# Patient Record
Sex: FEMALE | Race: WHITE | NOT HISPANIC OR LATINO | Employment: OTHER | URBAN - METROPOLITAN AREA
[De-identification: names, ages, dates, MRNs, and addresses within clinical notes are randomized per-mention and may not be internally consistent; named-entity substitution may affect disease eponyms.]

---

## 2017-04-19 ENCOUNTER — HOSPITAL ENCOUNTER (OUTPATIENT)
Dept: RADIOLOGY | Facility: MEDICAL CENTER | Age: 72
End: 2017-04-19

## 2017-04-20 ENCOUNTER — OFFICE VISIT (OUTPATIENT)
Dept: PULMONOLOGY | Facility: HOSPICE | Age: 72
End: 2017-04-20
Payer: MEDICARE

## 2017-04-20 ENCOUNTER — APPOINTMENT (OUTPATIENT)
Dept: RADIOLOGY | Facility: IMAGING CENTER | Age: 72
End: 2017-04-20
Attending: INTERNAL MEDICINE
Payer: MEDICARE

## 2017-04-20 ENCOUNTER — HOSPITAL ENCOUNTER (OUTPATIENT)
Dept: RADIOLOGY | Facility: MEDICAL CENTER | Age: 72
End: 2017-04-20

## 2017-04-20 VITALS
TEMPERATURE: 97.3 F | BODY MASS INDEX: 28.04 KG/M2 | DIASTOLIC BLOOD PRESSURE: 72 MMHG | RESPIRATION RATE: 16 BRPM | HEART RATE: 91 BPM | WEIGHT: 185 LBS | OXYGEN SATURATION: 94 % | HEIGHT: 68 IN | SYSTOLIC BLOOD PRESSURE: 122 MMHG

## 2017-04-20 DIAGNOSIS — I26.99 OTHER PULMONARY EMBOLISM WITHOUT ACUTE COR PULMONALE (HCC): ICD-10-CM

## 2017-04-20 DIAGNOSIS — J18.9 COMMUNITY ACQUIRED PNEUMONIA: ICD-10-CM

## 2017-04-20 PROCEDURE — 3017F COLORECTAL CA SCREEN DOC REV: CPT | Mod: 8P | Performed by: INTERNAL MEDICINE

## 2017-04-20 PROCEDURE — 1101F PT FALLS ASSESS-DOCD LE1/YR: CPT | Mod: 8P | Performed by: INTERNAL MEDICINE

## 2017-04-20 PROCEDURE — G8432 DEP SCR NOT DOC, RNG: HCPCS | Performed by: INTERNAL MEDICINE

## 2017-04-20 PROCEDURE — 3014F SCREEN MAMMO DOC REV: CPT | Mod: 8P | Performed by: INTERNAL MEDICINE

## 2017-04-20 PROCEDURE — 71020 DX-CHEST-2 VIEWS: CPT | Mod: TC | Performed by: INTERNAL MEDICINE

## 2017-04-20 PROCEDURE — 1036F TOBACCO NON-USER: CPT | Performed by: INTERNAL MEDICINE

## 2017-04-20 PROCEDURE — 4040F PNEUMOC VAC/ADMIN/RCVD: CPT | Performed by: INTERNAL MEDICINE

## 2017-04-20 PROCEDURE — 99204 OFFICE O/P NEW MOD 45 MIN: CPT | Performed by: INTERNAL MEDICINE

## 2017-04-20 PROCEDURE — G8419 CALC BMI OUT NRM PARAM NOF/U: HCPCS | Performed by: INTERNAL MEDICINE

## 2017-04-20 RX ORDER — CEPHALEXIN 500 MG/1
CAPSULE ORAL
COMMUNITY
Start: 2017-04-14

## 2017-04-20 RX ORDER — BENZONATATE 100 MG/1
CAPSULE ORAL
COMMUNITY
Start: 2017-04-14

## 2017-04-20 NOTE — PATIENT INSTRUCTIONS
1. We have scheduled bilateral venous ultrasound of the lower extremities  2. Recommend continuing with Xarelto for about 6 months  3. Recommend continuing antibiotics as previously prescribed  4. Recommend revisiting with primary physician for health maintenance items such as mammogram and colonoscopy etc.  5. Recommend follow-up in 2 months

## 2017-04-20 NOTE — MR AVS SNAPSHOT
"        Melony Thorpe   2017 9:20 AM   Office Visit   MRN: [id number]    Department:  [facility]   Dept Phone:  [phone]    Description:  Female : 1945   Provider:  Owen Pires M.D.           Reason for Visit     Establish Care           Allergies as of 2017     Allergen Noted Reactions    Percocet [Oxycodone-Acetaminophen] 2017         You were diagnosed with     Community acquired pneumonia   [138712]       Other pulmonary embolism without acute cor pulmonale (CMS-HCC)   [[id number]]         Vital Signs     Blood Pressure Pulse Temperature Respirations Height Weight    122/72 mmHg 91 36.3 °C (97.3 °F) 16 1.727 m (5' 7.99\") 83.915 kg (185 lb)    Body Mass Index Oxygen Saturation Smoking Status             28.14 kg/m2 94% Former Smoker         Basic Information     Date Of Birth Sex Race Ethnicity Preferred Language    1945 Female Unable to Obtain Unknown English      Your appointments     2017 11:00 AM   Established Patient Pul with Owen Pires M.D.   [facility] (--)    [address] [place] [address] [address]  [facility] [id number]   [phone]              Health Maintenance        Date Due Completion Dates    PAP SMEAR 1964 ---    MAMMOGRAM 1983 ---    COLONOSCOPY 1993 ---    IMM ZOSTER VACCINE 2003 ---    BONE DENSITY 2008 ---    IMM PNEUMOCOCCAL 65+ (ADULT) LOW/MEDIUM RISK SERIES (1 of 2 - PCV13) 2008 ---    IMM DTaP/Tdap/Td Vaccine (2 - Td) 2024            Current Immunizations     Influenza TIV (IM) 2014, 1/10/2014, 2013    Pneumococcal polysaccharide vaccine (PPSV-23) 2011    Tdap Vaccine 2014      Below and/or attached are the medications your provider expects you to take. Review all of your home medications and newly ordered medications with your provider and/or pharmacist. Follow medication instructions as directed by your provider and/or pharmacist. Please keep your medication list " with you and share with your provider. Update the information when medications are discontinued, doses are changed, or new medications (including over-the-counter products) are added; and carry medication information at all times in the event of emergency situations     Allergies:  PERCOCET - (reactions not documented)               Medications  Valid as of: April 20, 2017 - 10:12 AM    Generic Name Brand Name Tablet Size Instructions for use    Benzonatate (Cap) TESSALON 100 MG         Cephalexin (Cap) KEFLEX 500 MG         Oxycodone-Acetaminophen (Tab) PERCOCET 5-325 MG Take 1-2 Tabs by mouth every 6 hours as needed.        Rivaroxaban (Tab) XARELTO 15 MG Take 15 mg by mouth with dinner. Take 1 tablet by mouth in the AM        Rivaroxaban (Tab) XARELTO 20 MG Take 20 mg by mouth with dinner.        .                 Medicines prescribed today were sent to:     [facility] - [address] [address]    Phone: [phone] Fax: [phone]    Open 24 Hours?: No      Medication refill instructions:       If your prescription bottle indicates you have medication refills left, it is not necessary to call your provider’s office. Please contact your pharmacy and they will refill your medication.    If your prescription bottle indicates you do not have any refills left, you may request refills at any time through one of the following ways: The online [employer] system (except Urgent Care), by calling your provider’s office, or by asking your pharmacy to contact your provider’s office with a refill request. Medication refills are processed only during regular business hours and may not be available until the next business day. Your provider may request additional information or to have a follow-up visit with you prior to refilling your medication.   *Please Note: Medication refills are assigned a new Rx number when refilled electronically. Your  pharmacy may indicate that no refills were authorized even though a new prescription for the same medication is available at the pharmacy. Please request the medicine by name with the pharmacy before contacting your provider for a refill.        Your To Do List     Future Labs/Procedures Complete By Expires    DX-CHEST-2 VIEWS  As directed 4/20/2018    US-EXTREMITY VENOUS BILATERAL LOWER  As directed 4/20/2018      Instructions    1. We have scheduled bilateral venous ultrasound of the lower extremities  2. Recommend continuing with Xarelto for about 6 months  3. Recommend continuing antibiotics as previously prescribed  4. Recommend revisiting with primary physician for health maintenance items such as mammogram and colonoscopy etc.  5. Recommend follow-up in 2 months          IntY Access Code: [email or web address]  Expires: 5/20/2017  9:43 AM    IntY  A secure, online tool to manage your health information     [employer]’s IntY® is a secure, online tool that connects you to your personalized health information from the privacy of your home -- day or night - making it very easy for you to manage your healthcare. Once the activation process is completed, you can even access your medical information using the IntY víctor, which is available for free in the Apple Víctor store or Google Play store.     IntY provides the following levels of access (as shown below):   My Chart Features   [facility] Doctor [facility]  Specialists [facility]  Urgent  Care [facility]  Primary Care  Doctor   Email your healthcare team securely and privately 24/7 X X X    Manage appointments: schedule your next appointment; view details of past/upcoming appointments X      Request prescription refills. X      View recent personal medical records, including lab and immunizations X X X X   View health record, including health history, allergies, medications X X X X   Read reports about your outpatient visits, procedures, consult and  ER notes X X X X   See your discharge summary, which is a recap of your hospital and/or ER visit that includes your diagnosis, lab results, and care plan. X X       How to register for [email or web address]:  1. Go to  [email or web address].  2. Click on the Sign Up Now box, which takes you to the New Member Sign Up page. You will need to provide the following information:  a. Enter your too.me Access Code exactly as it appears at the top of this page. (You will not need to use this code after you’ve completed the sign-up process. If you do not sign up before the expiration date, you must request a new code.)   b. Enter your date of birth.   c. Enter your home email address.   d. Click Submit, and follow the next screen’s instructions.  3. Create a "[employer]"t ID. This will be your "[employer]"t login ID and cannot be changed, so think of one that is secure and easy to remember.  4. Create a too.me password. You can change your password at any time.  5. Enter your Password Reset Question and Answer. This can be used at a later time if you forget your password.   6. Enter your e-mail address. This allows you to receive e-mail notifications when new information is available in too.me.  7. Click Sign Up. You can now view your health information.    For assistance activating your too.me account, call [phone]

## 2017-04-20 NOTE — PROGRESS NOTES
Melony Thorpe is a 72 y.o. female here for recent pulmonary embolism.  Patient was referred by Dr Robert Pemberton.    History of Present Illness:    The patient is a 72-year-old female who comes in for evaluation of recent pulmonary embolism. She states that she had surgery in October for a fracture of the distal left tibia and she had surgery and plates were inserted. She's been wearing a compression stocking on her left lower extremity. She typically has to drive upwards of 2 hours to go shopping from where she lives. She started having pain in her right upper quadrant and right lateral chest. She went to the emergency room initially and everything was negative but 2 days later she returned complaining of worsening pain and she had a CT scan of the chest PE protocol on 9 April and this showed a right lower lobe pulmonary embolism. She was started on Xarelto and sent home. The d-dimer was elevated. A few days later she started to have persistent pains and decided to go back to the emergency room. A abdominal pelvic CT demonstrated a right lower lobe pneumonia and trace pleural effusion. She was treated with azithromycin and cephalexin. The patient says that the sputum which was yellow colored is now turned to a clear color and her pains are better. She is a history of smoking about one half pack a day for 50 years and she quit smoking in October. She has no history of COPD or asthma. She does not take any inhalers. She is not using any oxygen at home. Her past medical history is notable for just some skin cancer.    Constitutional:  Negative for fever, chills, sweats, and fatigue.  Eyes:  Negative for eye pain and visual changes.  HENT:  Negative for tinnitus and hoarse voice.  Cardiovascular:  Negative for chest pain, leg swelling, syncope and orthopnea.  Respiratory:  See HPI for pertinent negatives  Sleep:  Negative for somnolence, loud snoring, sleep disturbance due to breathing, insomnia.  Gastrointestinal:   "Negative for dysphagia, nausea and abdominal pain.  Heme/lymph:  Denies easy bruising, blood clots.  Musculoskeletal:  Negative for arthralgias, sore muscles and back pain.  Skin:  Negative for rash and color change.  Neurological:  Negative for headaches, lightheadedness and weakness.  Psychiatric:  Denies depression.    Current Outpatient Prescriptions   Medication Sig Dispense Refill   • benzonatate (TESSALON) 100 MG Cap      • cephALEXin (KEFLEX) 500 MG Cap      • rivaroxaban (XARELTO) 15 MG Tab tablet Take 15 mg by mouth with dinner. Take 1 tablet by mouth in the AM     • rivaroxaban (XARELTO) 20 MG Tab tablet Take 20 mg by mouth with dinner.     • oxycodone-acetaminophen (PERCOCET) 5-325 MG Tab Take 1-2 Tabs by mouth every 6 hours as needed.       No current facility-administered medications for this visit.       Social History   Substance Use Topics   • Smoking status: Former Smoker -- 0.25 packs/day for 50 years     Types: Cigarettes     Quit date: 10/01/2016   • Smokeless tobacco: Never Used   • Alcohol Use: No       Past Medical History   Diagnosis Date   • Chest discomfort    • SOB (shortness of breath)    • Chronic sinusitis    • Impaired hearing    • Runny nose    • Sneezing    • Heartburn    • Indigestion    • Fatigue    • Arthritis    • Pulmonary embolism (CMS-HCC)    • Chickenpox    • [place] measles    • Whooping cough    • Mumps    • Tonsillitis        Past Surgical History   Procedure Laterality Date   • Other orthopedic surgery       Repair broken leg   • Tonsillectomy         Allergies:  Percocet    Family History   Problem Relation Age of Onset   • Diabetes Mother    • Hypertension Father        Physical Examination    Filed Vitals:    04/20/17 0858   Height: 1.727 m (5' 7.99\")   Weight: 83.915 kg (185 lb)   Weight % change since last entry.: 0 %   BP: 122/72   Pulse: 91   BMI (Calculated): 28.14   Resp: 16   Temp: 36.3 °C (97.3 °F)       Physical Exam:  Constitutional:  Well developed and well " nourished.  Head:  Normocephalic and atraumatic.  Nose:  Nose normal.  Mouth/Throat:  Oropharynx is clear and moist, no lesions.    Eyes:  Conjunctivae and EOM are normal.  Pupils are equal, round, and reactive to light.  Neck:  Normal range of motion.  Supple.  No JVD. No tracheal deviation.  No thyromegally  Cardiovascular:  Normal rate, regular rhythm, normal heart sounds and intact distal pulses.  Pulmonary/Chest:  No accessory muscle use.  No wheezing, rales or rhonchi.  No dullness to percussion, tenderness or deformity.  Abdominal:  Soft.  No ascites.  No Hepatosplenomegally.  Non tender.  Musculoskeletal.  Normal range of motion.  No muscular atrophy.  Lymphadenopathy:  No cervical or supraclavicular adenopathy  Neurological:  Alert and oriented.  Cranial nerves intact.  No focal deficits  Skin:  No rashes or ulcers.  Psyciatric:  Normal mood and affect.    Imaging: Chest x-ray today shows a small right pleural effusion and some right lower lobe infiltrate versus atelectasis.        Assessment and Plan:  1. Community acquired pneumonia  It is unclear whether the chest x-ray is consistent with a pulmonary infarct versus a pneumonia. Clinically the patient seems to have improved with antibiotics. Have encouraged patient to finish her antibiotics. We will plan to see her back in 2 months and we'll repeat her chest x-ray at that time.  - DX-CHEST-2 VIEWS; Future    2. Other pulmonary embolism without acute cor pulmonale (CMS-HCC)  The patient developed a right lower lobe pulmonary embolism. She had surgery in October on her left lower extremity. That was about 6 months ago. She's had some prolonged car rides but nothing beyond her usual. Even though this could be a provoked event I believe the timing is somewhat prolonged in order to attribute this to the surgery. As such I would approach the pulmonary embolism as an unprovoked event. I have recommended 6 months of anticoagulation. At the end of the 6 months I  recommend doing a hypercoagulable panel about 4 or 5 weeks after stopping the Xarelto. We will also do bilateral lower extremity venous Dopplers to rule out chronic DVT. She should have her usual health maintenance cancer screenings.      Followup in 2 months

## 2017-06-14 ENCOUNTER — TELEPHONE (OUTPATIENT)
Dept: PULMONOLOGY | Facility: HOSPICE | Age: 72
End: 2017-06-14

## 2017-06-14 NOTE — TELEPHONE ENCOUNTER
Called.  She has had no spinal taps or needles or epidurals.  There has been no spinal trauma. Numbness comes and goes.  There are no signs of bleeding and she has no weakness.  Continue Xarelto for now but if she has any persistent symptoms then she will call back or go to ER.

## 2017-06-14 NOTE — TELEPHONE ENCOUNTER
Pt called in concerned that she is having a reaction to her medication(Xarelto). She is having a numbing sensation in her hip and upper thigh. Wants someone to call her this morning.

## 2017-06-23 ENCOUNTER — HOSPITAL ENCOUNTER (OUTPATIENT)
Dept: LAB | Facility: MEDICAL CENTER | Age: 72
End: 2017-06-23
Attending: INTERNAL MEDICINE
Payer: MEDICARE

## 2017-06-23 ENCOUNTER — OFFICE VISIT (OUTPATIENT)
Dept: PULMONOLOGY | Facility: HOSPICE | Age: 72
End: 2017-06-23
Payer: MEDICARE

## 2017-06-23 ENCOUNTER — HOSPITAL ENCOUNTER (OUTPATIENT)
Dept: RADIOLOGY | Facility: MEDICAL CENTER | Age: 72
End: 2017-06-23
Attending: INTERNAL MEDICINE
Payer: MEDICARE

## 2017-06-23 VITALS
TEMPERATURE: 97.7 F | WEIGHT: 185 LBS | OXYGEN SATURATION: 97 % | HEART RATE: 70 BPM | RESPIRATION RATE: 16 BRPM | SYSTOLIC BLOOD PRESSURE: 120 MMHG | DIASTOLIC BLOOD PRESSURE: 74 MMHG | HEIGHT: 67 IN | BODY MASS INDEX: 29.03 KG/M2

## 2017-06-23 DIAGNOSIS — I26.99 OTHER PULMONARY EMBOLISM WITHOUT ACUTE COR PULMONALE, UNSPECIFIED CHRONICITY (HCC): ICD-10-CM

## 2017-06-23 DIAGNOSIS — I27.82 OTHER CHRONIC PULMONARY EMBOLISM WITHOUT ACUTE COR PULMONALE (HCC): ICD-10-CM

## 2017-06-23 DIAGNOSIS — J18.9 COMMUNITY ACQUIRED PNEUMONIA: ICD-10-CM

## 2017-06-23 DIAGNOSIS — I26.99 OTHER PULMONARY EMBOLISM WITHOUT ACUTE COR PULMONALE (HCC): ICD-10-CM

## 2017-06-23 LAB
BUN SERPL-MCNC: 19 MG/DL (ref 8–22)
CREAT SERPL-MCNC: 1.14 MG/DL (ref 0.5–1.4)
GFR SERPL CREATININE-BSD FRML MDRD: 47 ML/MIN/1.73 M 2

## 2017-06-23 PROCEDURE — 71275 CT ANGIOGRAPHY CHEST: CPT

## 2017-06-23 PROCEDURE — 99214 OFFICE O/P EST MOD 30 MIN: CPT | Performed by: INTERNAL MEDICINE

## 2017-06-23 RX ORDER — SULFAMETHOXAZOLE AND TRIMETHOPRIM 800; 160 MG/1; MG/1
TABLET ORAL
COMMUNITY
Start: 2017-04-24

## 2017-06-23 NOTE — PROGRESS NOTES
Melony Thorpe is a 72 y.o. female here for pulmonary embolism.    History of Present Illness:    The patient is a 72-year-old with a history of a right-sided pulmonary embolism and a right lower lobe pulmonary infarct versus pneumonia. She comes in for follow-up. She is on Xarelto 20 mg daily. She denies any shortness of breath but she still having some coughing and the mucus is yellow colored. She's had 2 rounds of antibiotics. She's had no fevers or chills. She still having some mild pleuritic pains at the right lateral mid chest. We did bilateral lower extremity venous Dopplers and they were negative for DVT. Her oxygen saturations 97% on room air today.    Constitutional:  Negative for fever, chills, sweats, and fatigue.  Eyes:  Negative for eye pain and visual changes.  HENT:  Negative for tinnitus and hoarse voice.  Cardiovascular:  Negative for chest pain, leg swelling, syncope and orthopnea.  Respiratory:  See HPI for pertinent negatives  Sleep:  Negative for somnolence, loud snoring, sleep disturbance due to breathing, insomnia.  Gastrointestinal:  Negative for dysphagia, nausea and abdominal pain.  Heme/lymph:  Denies easy bruising, blood clots.  Musculoskeletal:  Negative for arthralgias, sore muscles and back pain.  Skin:  Negative for rash and color change.  Neurological:  Negative for headaches, lightheadedness and weakness.  Psychiatric:  Denies depression.    Current Outpatient Prescriptions   Medication Sig Dispense Refill   • sulfamethoxazole-trimethoprim (BACTRIM DS) 800-160 MG tablet      • benzonatate (TESSALON) 100 MG Cap      • cephALEXin (KEFLEX) 500 MG Cap      • rivaroxaban (XARELTO) 15 MG Tab tablet Take 15 mg by mouth with dinner. Take 1 tablet by mouth in the AM     • rivaroxaban (XARELTO) 20 MG Tab tablet Take 20 mg by mouth with dinner.     • oxycodone-acetaminophen (PERCOCET) 5-325 MG Tab Take 1-2 Tabs by mouth every 6 hours as needed.       No current facility-administered  "medications for this visit.       Social History   Substance Use Topics   • Smoking status: Former Smoker -- 0.25 packs/day for 50 years     Types: Cigarettes     Quit date: 10/01/2016   • Smokeless tobacco: Never Used   • Alcohol Use: No       Past Medical History   Diagnosis Date   • Chest discomfort    • SOB (shortness of breath)    • Chronic sinusitis    • Impaired hearing    • Runny nose    • Sneezing    • Heartburn    • Indigestion    • Fatigue    • Arthritis    • Pulmonary embolism (CMS-HCC)    • Chickenpox    • [place] measles    • Whooping cough    • Mumps    • Tonsillitis        Past Surgical History   Procedure Laterality Date   • Other orthopedic surgery       Repair broken leg   • Tonsillectomy         Allergies:  Percocet    Family History   Problem Relation Age of Onset   • Diabetes Mother    • Hypertension Father        Physical Examination    Filed Vitals:    06/23/17 1010   Height: 1.702 m (5' 7.01\")   Weight: 83.915 kg (185 lb)   Weight % change since last entry.: 0 %   BP: 120/74   Pulse: 70   BMI (Calculated): 28.97   Resp: 16   Temp: 36.5 °C (97.7 °F)   O2 sat % room air: 97 %       Physical Exam:  Constitutional:  Well developed and well nourished.  Head:  Normocephalic and atraumatic.  Nose:  Nose normal.  Mouth/Throat:  Oropharynx is clear and moist, no lesions.    Neck:  Normal range of motion.  Supple.  No JVD.  Cardiovascular:  Normal rate, regular rhythm, normal heart sounds. No edema  Pulmonary/Chest: No wheezing, rales or rhonchi.  Breath sounds are slightly reduced at the right lower chest with some mild loose rhonchi. Respiratory effort non labored  Musculoskeletal.  No muscular atrophy.  Lymphadenopathy:  No cervical or supraclavicular adenopathy  Neurological:  Alert and oriented.  Cranial nerves intact.  No focal deficits  Skin:  No rashes or ulcers.  Psyciatric:  Normal mood and affect.      Assessment and Plan:  1. Other chronic pulmonary embolism without acute cor pulmonale  The " patient will continue with Xarelto. She has already completed 3 months and the goal is to go at least 6 months. We will be plan to check genetic hypercoagulable labs when she is finished with anticoagulation.    2. Community acquired pneumonia  It's unclear to me whether her symptoms are related to a pulmonary infarct or a pneumonia. She's had 2 rounds of antibiotics and still is symptomatic. I have decided to order a repeat CT scan of the chest PE protocol to make sure that the embolism has resolved. Also we can get a better idea as to what's going on at the right lower lobe and the right pleural space. We'll try to get the CT scan of the chest done today.    Followup Return in about 7 days (around 6/30/2017) for follow up visit with Owen Pires MD.

## 2017-06-23 NOTE — MR AVS SNAPSHOT
"        Melony Mcallisterle   2017 11:00 AM   Office Visit   MRN: [id number]    Department:  [facility]   Dept Phone:  [phone]    Description:  Female : 1945   Provider:  Owen Pires M.D.           Reason for Visit     Follow-Up           Allergies as of 2017     Allergen Noted Reactions    Percocet [Oxycodone-Acetaminophen] 2017         You were diagnosed with     Other chronic pulmonary embolism without acute cor pulmonale   [[id number]]       Community acquired pneumonia   [[id number]]         Vital Signs     Blood Pressure Pulse Temperature Respirations Height Weight    120/74 mmHg 70 36.5 °C (97.7 °F) 16 1.702 m (5' 7.01\") 83.915 kg (185 lb)    Body Mass Index Oxygen Saturation Smoking Status             28.97 kg/m2 97% Former Smoker         Basic Information     Date Of Birth Sex Race Ethnicity Preferred Language    1945 Female Unable to Obtain Unknown English      Your appointments     2017  2:00 PM   CT-CTA CHEST W/WO POST PROCESS with [address] [place] [address] IMAGING - CT - [address])    [address]  [facility] [address]   [phone]            2017 10:00 AM   Established Patient Pul with Owen Pires M.D.   [facility] (--)    [address]  [address]  [facility] [id number] [phone]              Health Maintenance        Date Due Completion Dates    PAP SMEAR 1964 ---    MAMMOGRAM 1983 ---    COLONOSCOPY 1993 ---    IMM ZOSTER VACCINE 2003 ---    BONE DENSITY 2008 ---    IMM PNEUMOCOCCAL 65+ (ADULT) LOW/MEDIUM RISK SERIES (2 of 2 - PCV13) 2012    IMM DTaP/Tdap/Td Vaccine (2 - Td) 2024            Current Immunizations     Influenza TIV (IM) 2014, 1/10/2014, 2013    Pneumococcal polysaccharide vaccine (PPSV-23) 2011    Tdap Vaccine 2014      Below and/or attached are the medications your provider expects you to take. Review all of your home " medications and newly ordered medications with your provider and/or pharmacist. Follow medication instructions as directed by your provider and/or pharmacist. Please keep your medication list with you and share with your provider. Update the information when medications are discontinued, doses are changed, or new medications (including over-the-counter products) are added; and carry medication information at all times in the event of emergency situations     Allergies:  PERCOCET - (reactions not documented)               Medications  Valid as of: June 23, 2017 - 11:39 AM    Generic Name Brand Name Tablet Size Instructions for use    Benzonatate (Cap) TESSALON 100 MG         Cephalexin (Cap) KEFLEX 500 MG         Oxycodone-Acetaminophen (Tab) PERCOCET 5-325 MG Take 1-2 Tabs by mouth every 6 hours as needed.        Rivaroxaban (Tab) XARELTO 15 MG Take 15 mg by mouth with dinner. Take 1 tablet by mouth in the AM        Rivaroxaban (Tab) XARELTO 20 MG Take 20 mg by mouth with dinner.        Sulfamethoxazole-Trimethoprim (Tab) BACTRIM -160 MG         .                 Medicines prescribed today were sent to:     [facility] [address] [address]    Phone: [phone] Fax: [phone]    Open 24 Hours?: No      Medication refill instructions:       If your prescription bottle indicates you have medication refills left, it is not necessary to call your provider’s office. Please contact your pharmacy and they will refill your medication.    If your prescription bottle indicates you do not have any refills left, you may request refills at any time through one of the following ways: The online [employer] system (except Urgent Care), by calling your provider’s office, or by asking your pharmacy to contact your provider’s office with a refill request. Medication refills are processed only during regular business hours and may not be available  until the next business day. Your provider may request additional information or to have a follow-up visit with you prior to refilling your medication.   *Please Note: Medication refills are assigned a new Rx number when refilled electronically. Your pharmacy may indicate that no refills were authorized even though a new prescription for the same medication is available at the pharmacy. Please request the medicine by name with the pharmacy before contacting your provider for a refill.        Instructions    1. We have scheduled a CT scan of the chest  2. Follow-up after the CT scan of the chest          [employer] Access Code: [email or web address]  Expires: 7/22/2017  8:09 AM    [employer]  A secure, online tool to manage your health information     [employer]® is a secure, online tool that connects you to your personalized health information from the privacy of your home -- day or night - making it very easy for you to manage your healthcare. Once the activation process is completed, you can even access your medical information using the [employer] víctor, which is available for free in the Apple Víctor store or Google Play store.     [employer] provides the following levels of access (as shown below):   My Chart Features   [facility]  Urgent  Care [facility]  Primary Care  Doctor   Email your healthcare team securely and privately 24/7 X X X    Manage appointments: schedule your next appointment; view details of past/upcoming appointments X      Request prescription refills. X      View recent personal medical records, including lab and immunizations X X X X   View health record, including health history, allergies, medications X X X X   Read reports about your outpatient visits, procedures, consult and ER notes X X X X   See your discharge summary, which is a recap of your hospital and/or ER visit that includes your diagnosis, lab results, and care plan. X X       How to  register for [facility]:  1. Go to  [email or web address].  2. Click on the Sign Up Now box, which takes you to the New Member Sign Up page. You will need to provide the following information:  a. Enter your [facility] Access Code exactly as it appears at the top of this page. (You will not need to use this code after you’ve completed the sign-up process. If you do not sign up before the expiration date, you must request a new code.)   b. Enter your date of birth.   c. Enter your home email address.   d. Click Submit, and follow the next screen’s instructions.  3. Create a [employer] ID. This will be your [facility] login ID and cannot be changed, so think of one that is secure and easy to remember.  4. Create a [employer] password. You can change your password at any time.  5. Enter your Password Reset Question and Answer. This can be used at a later time if you forget your password.   6. Enter your e-mail address. This allows you to receive e-mail notifications when new information is available in [facility].  7. Click Sign Up. You can now view your health information.    For assistance activating your [facility] account, call [phone]

## 2017-06-23 NOTE — PROGRESS NOTES
Patient is getting her CTA done and thinks she needs labs. I assume it is for BUN and Creatinine. Please advise

## 2017-06-26 ENCOUNTER — TELEPHONE (OUTPATIENT)
Dept: PULMONOLOGY | Facility: HOSPICE | Age: 72
End: 2017-06-26

## 2017-06-26 NOTE — TELEPHONE ENCOUNTER
Owen Pires M.D.  Linda Portillo, Med Ass't       Caller: Unspecified (Today, 10:23 AM)                     Please call patient.  Yes no need for July 5th appt. Unless she wants to come in to talk.  I can see her in October.         Scheduled patient in October/ap

## 2017-06-26 NOTE — TELEPHONE ENCOUNTER
Dr. Pires,    This patient had a CTA on Friday and was called by you. She wants to know if she needs to keep her appt on 07/05/17. If not, should she come in around the beginning of October before she stops the Xarelto?    Please advise!

## 2017-09-19 ENCOUNTER — TELEPHONE (OUTPATIENT)
Dept: SLEEP MEDICINE | Facility: MEDICAL CENTER | Age: 72
End: 2017-09-19

## 2017-09-19 NOTE — TELEPHONE ENCOUNTER
Pt called wanting to know if she needed to continue on the Xarelto 20, states that her 6 month ashley is approaching and she is almost out of the rx but still has some 15 mg on hand until her next visit on 10/10/17.  Pt would also like to know if  Dr. Pires wants her to discontinue w/ the Xarelto, how does she just gradually stop or what is she to do?  Pt aslo states that she feels better.  Please advise!    Last OV: 6/23/17- Dr. Pires   Next OV: 10/10/17  Other chronic pulmonary embolism w/o acute cor pulmonale

## 2017-09-19 NOTE — TELEPHONE ENCOUNTER
Dr Pires please advise. Patient was seen by you in June. She complained of worsening respiratory symptoms, productive cough. You ordered a repeat CT to ensure resolution of her PE and no developing pneumonia. CT was benign but she never followed up for results. Your note says at least 6 months xarelto, therefore I would recommend she continue on xarelto until your appointment with her in October. Are you in agreement. Is there anything further he would like to do.

## 2017-09-19 NOTE — TELEPHONE ENCOUNTER
Pt. Called wanting ADVICE on her Xeralto 20 MG. She will not be in to see Dr. Pires until 10/10/2017 she wants to know if she can come off of it? She does not want to depend on it unless she absolutely needs to stay on it and get a refill.     Please advice?

## 2017-09-20 NOTE — TELEPHONE ENCOUNTER
Doris Grayson, [facility]   Josefa Edwards A.P.R.NSophie 25 minutes ago (8:30 AM)      Called patient and informed her to stay on Xeralto. (Routing comment)      Owen Pires M.D.   Josefa Edwards, A.P.R.NSophie 16 hours ago (4:10 PM)      Agree.  I can discuss with her in Oct.  Thanks (Routing comment)

## 2017-09-20 NOTE — TELEPHONE ENCOUNTER
DR Pires reviewed my note and is in agreement. Please have continue xarelto until she is seen on Oct 10th then they can discuss discontinuing.

## 2017-10-10 ENCOUNTER — OFFICE VISIT (OUTPATIENT)
Dept: PULMONOLOGY | Facility: HOSPICE | Age: 72
End: 2017-10-10
Payer: MEDICARE

## 2017-10-10 VITALS
HEIGHT: 67 IN | HEART RATE: 72 BPM | SYSTOLIC BLOOD PRESSURE: 122 MMHG | RESPIRATION RATE: 16 BRPM | WEIGHT: 200 LBS | DIASTOLIC BLOOD PRESSURE: 87 MMHG | OXYGEN SATURATION: 92 % | BODY MASS INDEX: 31.39 KG/M2

## 2017-10-10 DIAGNOSIS — I26.99 OTHER ACUTE PULMONARY EMBOLISM WITHOUT ACUTE COR PULMONALE (HCC): ICD-10-CM

## 2017-10-10 PROCEDURE — 99214 OFFICE O/P EST MOD 30 MIN: CPT | Performed by: INTERNAL MEDICINE

## 2017-10-10 RX ORDER — DOXYCYCLINE HYCLATE 100 MG
TABLET ORAL
COMMUNITY
Start: 2017-09-07

## 2017-10-10 NOTE — PATIENT INSTRUCTIONS
1. Recommend stopping Xarelto  2. Recommend taking a daily aspirin  3. We have ordered blood work to be done in about 4 weeks  4. Follow-up in 6 months

## 2018-03-13 ENCOUNTER — TELEPHONE (OUTPATIENT)
Dept: PULMONOLOGY | Facility: HOSPICE | Age: 73
End: 2018-03-13

## 2018-03-13 NOTE — TELEPHONE ENCOUNTER
Spoke with patient she stated she will not be coming here anymore she will be going to [place] for Pulmonary.

## 2025-06-17 NOTE — PROGRESS NOTES
Melony Thorpe is a 72 y.o. female here for pulmonary embolism.    History of Present Illness:    The patient's a 72-year-old female who developed a right-sided pulmonary embolism and right lower lobe pulmonary infarct. She has taken Xarelto for a total of 6 months. This apparently was a non-provoked event. She is feeling well today and has no chest discomfort shortness of breath cough or congestion. Last CT scan of the chest showed complete resolution of the pulmonary emboli and otherwise no other significant chest pathology. The patient is anxious to get off of anticoagulation.    Constitutional:  Negative for fever, chills, sweats, and fatigue.  Eyes:  Negative for eye pain and visual changes.  HENT:  Negative for tinnitus and hoarse voice.  Cardiovascular:  Negative for chest pain, leg swelling, syncope and orthopnea.  Respiratory:  See HPI for pertinent negatives  Sleep:  Negative for somnolence, loud snoring, sleep disturbance due to breathing, insomnia.  Gastrointestinal:  Negative for dysphagia, nausea and abdominal pain.  Heme/lymph:  Denies easy bruising, blood clots.  Musculoskeletal:  Negative for arthralgias, sore muscles and back pain.  Skin:  Negative for rash and color change.  Neurological:  Negative for headaches, lightheadedness and weakness.  Psychiatric:  Denies depression.    Current Outpatient Prescriptions   Medication Sig Dispense Refill   • PROAIR  (90 Base) MCG/ACT Aero Soln inhalation aerosol      • doxycycline (VIBRAMYCIN) 100 MG Tab      • Fluticasone Furoate-Vilanterol (BREO ELLIPTA) 100-25 MCG/INH AEROSOL POWDER, BREATH ACTIVATED Inhale  by mouth.     • rivaroxaban (XARELTO) 15 MG Tab tablet Take 15 mg by mouth with dinner. Take 1 tablet by mouth in the AM     • sulfamethoxazole-trimethoprim (BACTRIM DS) 800-160 MG tablet      • benzonatate (TESSALON) 100 MG Cap      • cephALEXin (KEFLEX) 500 MG Cap      • rivaroxaban (XARELTO) 20 MG Tab tablet Take 20 mg by mouth with dinner.    "  • oxycodone-acetaminophen (PERCOCET) 5-325 MG Tab Take 1-2 Tabs by mouth every 6 hours as needed.       No current facility-administered medications for this visit.        Social History   Substance Use Topics   • Smoking status: Former Smoker     Packs/day: 0.25     Years: 50.00     Types: Cigarettes     Quit date: 10/1/2016   • Smokeless tobacco: Never Used   • Alcohol use No       Past Medical History:   Diagnosis Date   • Arthritis    • Chest discomfort    • Chickenpox    • Chronic sinusitis    • Fatigue    • [place] measles    • Heartburn    • Impaired hearing    • Indigestion    • Mumps    • Pulmonary embolism (CMS-HCC)    • Runny nose    • Sneezing    • SOB (shortness of breath)    • Tonsillitis    • Whooping cough        Past Surgical History:   Procedure Laterality Date   • OTHER ORTHOPEDIC SURGERY      Repair broken leg   • TONSILLECTOMY         Allergies:  Percocet [oxycodone-acetaminophen]    Family History   Problem Relation Age of Onset   • Diabetes Mother    • Hypertension Father        Physical Examination    Vitals:    10/10/17 0947   Height: 1.702 m (5' 7.01\")   Weight: 90.7 kg (200 lb)   Weight % change since last entry.: 0 %   BP: 122/87   Pulse: 72   BMI (Calculated): 31.32   Resp: 16   O2 sat % room air: 92 %       Physical Exam:  Constitutional:  Well developed and well nourished.  Head:  Normocephalic and atraumatic.  Nose:  Nose normal.  Mouth/Throat:  Oropharynx is clear and moist, no lesions.    Neck:  Normal range of motion.  Supple.  No JVD.  Cardiovascular:  Normal rate, regular rhythm, normal heart sounds. No edema  Pulmonary/Chest: No wheezing, rales or rhonchi.  Respiratory effort non labored  Musculoskeletal.  No muscular atrophy.  Lymphadenopathy:  No cervical or supraclavicular adenopathy  Neurological:  Alert and oriented.  Cranial nerves intact.  No focal deficits  Skin:  No rashes or ulcers.  Psyciatric:  Normal mood and affect.    Assessment and Plan:  1. Other acute " pulmonary embolism without acute cor pulmonale (CMS-HCC)  The patient can stop the Xarelto as she's completed 6 months of therapy. I have encouraged her to take a daily aspirin. We will check genetic hypercoagulability panel. If any of these are positive then may have to consider resuming anticoagulation. I have asked the patient to get the blood work done at least 4 weeks after stopping the Xarelto.  - ANTICARDIOLIPIN AB IGG IGM; Future  - ANTI-NUCLEAR ANTIBODY SERUM; Future  - LUPUS ANTICOAGULANT; Future  - PROTEIN C PANEL; Future  - PROTEIN S PANEL; Future  - HCHG ANTITHROMBIN III,ACTIVITY  - FACTOR V LEIDEN MUTATION; Future  - THROMBOTIC RISK PANEL DNA; Future      Followup Return in about 6 months (around 4/10/2018) for follow up with the pulmonary physician, follow up visit with JAGJIT.   difficulty with ambulation